# Patient Record
Sex: MALE | NOT HISPANIC OR LATINO | Employment: FULL TIME | URBAN - METROPOLITAN AREA
[De-identification: names, ages, dates, MRNs, and addresses within clinical notes are randomized per-mention and may not be internally consistent; named-entity substitution may affect disease eponyms.]

---

## 2023-09-12 NOTE — PROGRESS NOTES
"ENT Consultation    Eagle Hager who is a 43 year old male seen in consultation at the request of Emi Alvarez PA-C.      History of Present Illness - Eagle Hager is a 43 year old male ears   Presents primarily in regard to his left ear.  Apparently his baby gets 6 sets of tubes also had left tympanoplasty.  Has gradually felt that his hearing loss definitely worse on the left than the right.  However in the last year it has gotten much worse and lately has had intermittent mucus drainage and even some blood in the drainage was noted.  He apparently had an MRI done last year done at the [facility] which did not show any pathology according to the patient.  He has been on several antibiotics for drainage but no drops.  He apparently has history of seasonal allergies and even used to get allergy shots in the past when he was a child.  Has not been tested in the long time.      BP Readings from Last 1 Encounters:   09/20/23 130/82       Patient declined BP in clinic today    Eagle IS NOT a smoker/uses chewing tobacco.      Past Medical History - No past medical history on file.    Current Medications - No current outpatient medications on file.    Allergies -   Allergies   Allergen Reactions    Penicillins Other (See Comments) and Rash       Social History -   Social History     Socioeconomic History    Marital status: Single       Family History - No family history on file.    Review of Systems - As per HPI and PMHx, otherwise review of system review of the head and neck negative. Otherwise 10+ review of system is negative    Physical Exam  /82   Temp (!) 96.6  F (35.9  C) (Temporal)   Ht 1.918 m (6' 3.5\")   Wt 97.1 kg (214 lb)   BMI 26.40 kg/m    BMI: Body mass index is 26.4 kg/m .    General - The patient is well nourished and well developed, and appears to have good nutritional status.  Alert and oriented to person and place, answers questions and cooperates with examination " appropriately.    SKIN - No suspicious lesions or rashes.  Respiration - No respiratory distress.  Head and Face - Normocephalic and atraumatic, with no gross asymmetry noted of the contour of the facial features.  The facial nerve is intact, with strong symmetric movements.    Voice and Breathing - The patient was breathing comfortably without the use of accessory muscles. The patients voice was clear and strong, and had appropriate pitch and quality.    Ears - Bilateral pinna and EACs with normal appearing overlying skin.  Right tympanic membrane intact with good mobility on pneumatic otoscopy . Bony landmarks of the ossicular chain are normal. The tympanic membrane is normal in appearance. No retraction, perforation, or masses.  No fluid or purulence was seen in the external canal or the middle ear.   However on the left side significant inflammation was noted with what appears to be more anterior perforation with granulation tissue noted.  Eyes - Extraocular movements intact.  Sclera were not icteric or injected, conjunctiva were pink and moist.    Mouth - Examination of the oral cavity showed pink, healthy oral mucosa. No lesions or ulcerations noted.  The tongue was mobile and midline, and the dentition were in good condition.      Throat - The walls of the oropharynx were smooth, pink, moist, symmetric, and had no lesions or ulcerations.  The tonsillar pillars and soft palate were symmetric.  The uvula was midline on elevation.    Neck - Normal midline excursion of the laryngotracheal complex during swallowing.  Full range of motion on passive movement.  Palpation of the occipital, submental, submandibular, internal jugular chain, and supraclavicular nodes did not demonstrate any abnormal lymph nodes or masses.  The carotid pulse was palpable bilaterally.  Palpation of the thyroid was soft and smooth, with no nodules or goiter appreciated.  The trachea was mobile and midline.    Nose - External contour is  symmetric, no gross deflection or scars.  Nasal mucosa is pale and moist with some excess clear mucus.  The septum was midline and non-obstructive, turbinates of enlarged size and position.  No polyps, masses, or purulence noted on examination.    Neuro - Nonfocal neuro exam is normal, CN 2 through 12 intact, normal gait and muscle tone.      Performed in clinic today:  Audiologic Studies - An audiogram and tympanogram were performed today as part of the evaluation and personally reviewed. The tympanogram shows Type A curves on the right and Type B curves on the left, with normal on the right large on the left canal volumes and middle ear pressures.  The audiogram showed normal thresholds on the right and moderate mixed loss on the left.    Word recognition score 100% on the right and 100% on the left.    A/P - Eagle Hager is a 43 year old male patient with chronic otitis media following left ear.  At this point we will put him on Ciprodex drops since he has not used drops previously.  We will reevaluate in a few weeks to see how he is doing.  In regard to his allergic rhinitis patient will continue using nasal steroid sprays but also will add azelastine as adjunct.      Andrew Vega MD

## 2023-09-20 ENCOUNTER — OFFICE VISIT (OUTPATIENT)
Dept: AUDIOLOGY | Facility: OTHER | Age: 43
End: 2023-09-20
Payer: COMMERCIAL

## 2023-09-20 ENCOUNTER — OFFICE VISIT (OUTPATIENT)
Dept: OTOLARYNGOLOGY | Facility: OTHER | Age: 43
End: 2023-09-20
Payer: COMMERCIAL

## 2023-09-20 VITALS
DIASTOLIC BLOOD PRESSURE: 82 MMHG | TEMPERATURE: 96.6 F | WEIGHT: 214 LBS | HEIGHT: 76 IN | SYSTOLIC BLOOD PRESSURE: 130 MMHG | BODY MASS INDEX: 26.06 KG/M2

## 2023-09-20 DIAGNOSIS — J30.9 ALLERGIC RHINITIS, UNSPECIFIED SEASONALITY, UNSPECIFIED TRIGGER: Primary | ICD-10-CM

## 2023-09-20 DIAGNOSIS — H90.12 CONDUCTIVE HEARING LOSS OF LEFT EAR WITH UNRESTRICTED HEARING OF RIGHT EAR: Primary | ICD-10-CM

## 2023-09-20 DIAGNOSIS — H65.32: ICD-10-CM

## 2023-09-20 PROCEDURE — 92557 COMPREHENSIVE HEARING TEST: CPT | Performed by: AUDIOLOGIST

## 2023-09-20 PROCEDURE — 92567 TYMPANOMETRY: CPT | Performed by: AUDIOLOGIST

## 2023-09-20 PROCEDURE — 99203 OFFICE O/P NEW LOW 30 MIN: CPT | Performed by: OTOLARYNGOLOGY

## 2023-09-20 RX ORDER — AZELASTINE 1 MG/ML
2 SPRAY, METERED NASAL 2 TIMES DAILY
Qty: 30 ML | Refills: 1 | Status: SHIPPED | OUTPATIENT
Start: 2023-09-20 | End: 2023-10-20

## 2023-09-20 RX ORDER — CIPROFLOXACIN AND DEXAMETHASONE 3; 1 MG/ML; MG/ML
4 SUSPENSION/ DROPS AURICULAR (OTIC) 2 TIMES DAILY
Qty: 7.5 ML | Refills: 0 | Status: SHIPPED | OUTPATIENT
Start: 2023-09-20 | End: 2023-10-11

## 2023-09-20 ASSESSMENT — PAIN SCALES - GENERAL: PAINLEVEL: NO PAIN (0)

## 2023-09-20 NOTE — LETTER
"    9/20/2023         RE: John Hager  [address] [place] [address]        Dear Colleague,    Thank you for referring your patient, John Hager, to the [facility]. Please see a copy of my visit note below.    ENT Consultation    Eagle Hager who is a 43 year old male seen in consultation at the request of Emi Alvarez PA-C.      History of Present Illness - Eagle Hager is a 43 year old male ears   Presents primarily in regard to his left ear.  Apparently his baby gets 6 sets of tubes also had left tympanoplasty.  Has gradually felt that his hearing loss definitely worse on the left than the right.  However in the last year it has gotten much worse and lately has had intermittent mucus drainage and even some blood in the drainage was noted.  He apparently had an MRI done last year done at the [facility] which did not show any pathology according to the patient.  He has been on several antibiotics for drainage but no drops.  He apparently has history of seasonal allergies and even used to get allergy shots in the past when he was a child.  Has not been tested in the long time.      BP Readings from Last 1 Encounters:   09/20/23 130/82       Patient declined BP in clinic today    Eagle IS NOT a smoker/uses chewing tobacco.      Past Medical History - No past medical history on file.    Current Medications - No current outpatient medications on file.    Allergies -   Allergies   Allergen Reactions     Penicillins Other (See Comments) and Rash       Social History -   Social History     Socioeconomic History     Marital status: Single       Family History - No family history on file.    Review of Systems - As per HPI and PMHx, otherwise review of system review of the head and neck negative. Otherwise 10+ review of system is negative    Physical Exam  /82   Temp (!) 96.6  F (35.9  C) (Temporal)   Ht 1.918 m (6' 3.5\")   Wt 97.1 kg (214 lb)   BMI " 26.40 kg/m    BMI: Body mass index is 26.4 kg/m .    General - The patient is well nourished and well developed, and appears to have good nutritional status.  Alert and oriented to person and place, answers questions and cooperates with examination appropriately.    SKIN - No suspicious lesions or rashes.  Respiration - No respiratory distress.  Head and Face - Normocephalic and atraumatic, with no gross asymmetry noted of the contour of the facial features.  The facial nerve is intact, with strong symmetric movements.    Voice and Breathing - The patient was breathing comfortably without the use of accessory muscles. The patients voice was clear and strong, and had appropriate pitch and quality.    Ears - Bilateral pinna and EACs with normal appearing overlying skin.  Right tympanic membrane intact with good mobility on pneumatic otoscopy . Bony landmarks of the ossicular chain are normal. The tympanic membrane is normal in appearance. No retraction, perforation, or masses.  No fluid or purulence was seen in the external canal or the middle ear.   However on the left side significant inflammation was noted with what appears to be more anterior perforation with granulation tissue noted.  Eyes - Extraocular movements intact.  Sclera were not icteric or injected, conjunctiva were pink and moist.    Mouth - Examination of the oral cavity showed pink, healthy oral mucosa. No lesions or ulcerations noted.  The tongue was mobile and midline, and the dentition were in good condition.      Throat - The walls of the oropharynx were smooth, pink, moist, symmetric, and had no lesions or ulcerations.  The tonsillar pillars and soft palate were symmetric.  The uvula was midline on elevation.    Neck - Normal midline excursion of the laryngotracheal complex during swallowing.  Full range of motion on passive movement.  Palpation of the occipital, submental, submandibular, internal jugular chain, and supraclavicular nodes did not  demonstrate any abnormal lymph nodes or masses.  The carotid pulse was palpable bilaterally.  Palpation of the thyroid was soft and smooth, with no nodules or goiter appreciated.  The trachea was mobile and midline.    Nose - External contour is symmetric, no gross deflection or scars.  Nasal mucosa is pale and moist with some excess clear mucus.  The septum was midline and non-obstructive, turbinates of enlarged size and position.  No polyps, masses, or purulence noted on examination.    Neuro - Nonfocal neuro exam is normal, CN 2 through 12 intact, normal gait and muscle tone.      Performed in clinic today:  Audiologic Studies - An audiogram and tympanogram were performed today as part of the evaluation and personally reviewed. The tympanogram shows Type A curves on the right and Type B curves on the left, with normal on the right large on the left canal volumes and middle ear pressures.  The audiogram showed normal thresholds on the right and moderate mixed loss on the left.    Word recognition score 100% on the right and 100% on the left.    A/P - Eagle Hager is a 43 year old male patient with chronic otitis media following left ear.  At this point we will put him on Ciprodex drops since he has not used drops previously.  We will reevaluate in a few weeks to see how he is doing.  In regard to his allergic rhinitis patient will continue using nasal steroid sprays but also will add azelastine as adjunct.      Andrew Vega MD       Again, thank you for allowing me to participate in the care of your patient.        Sincerely,        Andrew Vega MD, MD

## 2023-09-20 NOTE — PROGRESS NOTES
AUDIOLOGY REPORT:    Patient was referred from ENT by Dr. Vega for audiology evaluation. The patient reports that he has had ear problems since he was an infant, and has had six sets of PE tubes as well as surgery to reconstruct the left eardrum, tonsillectomy, and adenoidectomy. He also reports a history of a tympanic membrane perforation in the right ear from waterskiing. The patient reports that he has hearing loss in the left ear, with no concerns about the right. He reports drainage from the left ear every few days. Antibiotics do not seem to help. The patient also notes difficulty equalizing pressure in the left ear, and he has allergy concerns. He does not have ear pain.    Testing:    Otoscopy:   Otoscopic exam indicates ears are clear of cerumen bilaterally     Tympanograms:    RIGHT: hypercompliant eardrum mobility     LEFT:   large ear canal volume consistent with tympanic membrane perforation    Thresholds:   Pure Tone Thresholds assessed using conventional audiometry with good reliability from 250-8000 Hz bilaterally using insert earphones and circumaural headphones     RIGHT:  normal hearing sensitivity at all tested frequencies     LEFT:     moderate rising to mild  conductive hearing loss with normal hearing sensitivity at 8000 Hz    Speech Reception Threshold:    RIGHT: 10 dB HL    LEFT:   40 dB HL  Results are in agreement with pure tone average.     Word Recognition Score:     RIGHT: 100% at 55 dB HL using NU-6 recorded word list.    LEFT:   100% at 80 dB HL using NU-6 recorded word list.    Discussed results with the patient.     Patient was returned to ENT for follow up.     Morgan Cobos, CCC-A  Licensed Audiologist #[id number]  9/20/2023

## 2023-10-30 NOTE — PROGRESS NOTES
"History of Present Illness - John Hager is a 43 year old male presenting in clinic today for a recheck on Patient presents with:  Follow Up    Patient initially presented with history of allergic rhinitis as well as persistent otitis media with drainage involving his left ear.  On the left side he has had multiple surgeries tympanoplasty's in the past.  He was switched to Ciprodex drops in the ear has cleared up.  Does not bother him anymore.  In regard to his nose he feels that the azelastine has been very helpful controlling his allergic rhinitis.      BP Readings from Last 1 Encounters:   11/01/23 122/82       BP noted to be well controlled today in office.     John IS NOT a smoker/uses chewing tobacco.     Past Medical History - No past medical history on file.    Current Medications - No current outpatient medications on file.    Allergies -   Allergies   Allergen Reactions    Penicillins Other (See Comments) and Rash       Social History -   Social History     Socioeconomic History    Marital status: Single   Tobacco Use    Smoking status: Former     Types: Cigarettes    Smokeless tobacco: Never   Substance and Sexual Activity    Alcohol use: Yes    Drug use: Never       Family History - No family history on file.    Review of Systems - As per HPI and PMHx, otherwise review of system review of the head and neck negative. Otherwise 10+ review of system is negative    Physical Exam  /82   Temp 97.2  F (36.2  C) (Temporal)   Ht 1.918 m (6' 3.5\")   Wt 100.7 kg (222 lb)   BMI 27.38 kg/m    BMI: Body mass index is 27.38 kg/m .    General - The patient is well nourished and well developed, and appears to have good nutritional status.  Alert and oriented to person and place, answers questions and cooperates with examination appropriately.    SKIN - No suspicious lesions or rashes.  Respiration - No respiratory distress.  Head and Face - Normocephalic and atraumatic, with no gross asymmetry noted of " the contour of the facial features.  The facial nerve is intact, with strong symmetric movements.    Voice and Breathing - The patient was breathing comfortably without the use of accessory muscles. The patients voice was clear and strong, and had appropriate pitch and quality.    Ears - Bilateral pinna and EACs with normal appearing overlying skin.  Right tympanic membrane intact with good mobility on pneumatic otoscopy .  Bony landmarks of the ossicular chain are normal. The tympanic membrane is normal in appearance. No retraction, perforation, or masses.  No fluid or purulence was seen in the external canal or the middle ear.   On the left side even though the ears dry mostly tympanic membrane is missing we can see the handle of the malleus and middle ear space mucosa that is clear.  Some scar tissue is noted in the area.  Eyes - Extraocular movements intact.  Sclera were not icteric or injected, conjunctiva were pink and moist.    Mouth - Examination of the oral cavity showed pink, healthy oral mucosa. No lesions or ulcerations noted.  The tongue was mobile and midline, and the dentition were in good condition.      Throat - The walls of the oropharynx were smooth, pink, moist, symmetric, and had no lesions or ulcerations.  The tonsillar pillars and soft palate were symmetric.  The uvula was midline on elevation.    Neck - Normal midline excursion of the laryngotracheal complex during swallowing.  Full range of motion on passive movement.  Palpation of the occipital, submental, submandibular, internal jugular chain, and supraclavicular nodes did not demonstrate any abnormal lymph nodes or masses.  The carotid pulse was palpable bilaterally.  Palpation of the thyroid was soft and smooth, with no nodules or goiter appreciated.  The trachea was mobile and midline.    Nose - External contour is symmetric, no gross deflection or scars.  Nasal mucosa is pink and moist with no abnormal mucus.  The septum was midline  and non-obstructive, turbinates of normal size and position.  No polyps, masses, or purulence noted on examination.    Neuro - Nonfocal neuro exam is normal, CN 2 through 12 intact, normal gait and muscle tone.      Performed in clinic today:  No procedures preformed in clinic today      A/P - John Hager is a 43 year old male Patient presents with:  Follow Up    Patient with a history of recurrent otitis media that has been subacute lately but now well controlled after the use of Ciprodex drops.  We discussed possibility of further surgeries for the tympanoplasty which would be difficult considering majority of the drum is missing.  Discussed possibility of amplification for his mixed loss but with well ventilated ear impression.  He will discuss this further with audiology.  In regard to his nose we will continue azelastine spray.  We will follow-up in 6 months.  John should follow up in 6 months.      At John next appointment they will need a hearing test.      Andrew Vega MD

## 2023-11-01 ENCOUNTER — OFFICE VISIT (OUTPATIENT)
Dept: OTOLARYNGOLOGY | Facility: OTHER | Age: 43
End: 2023-11-01
Payer: COMMERCIAL

## 2023-11-01 VITALS
DIASTOLIC BLOOD PRESSURE: 82 MMHG | BODY MASS INDEX: 27.03 KG/M2 | HEIGHT: 76 IN | WEIGHT: 222 LBS | TEMPERATURE: 97.2 F | SYSTOLIC BLOOD PRESSURE: 122 MMHG

## 2023-11-01 DIAGNOSIS — H74.12 CHRONIC ADHESIVE OTITIS MEDIA, LEFT: ICD-10-CM

## 2023-11-01 DIAGNOSIS — J30.9 ALLERGIC RHINITIS, UNSPECIFIED SEASONALITY, UNSPECIFIED TRIGGER: Primary | ICD-10-CM

## 2023-11-01 DIAGNOSIS — H72.92 TYMPANIC MEMBRANE PERFORATION, LEFT: ICD-10-CM

## 2023-11-01 PROCEDURE — 99214 OFFICE O/P EST MOD 30 MIN: CPT | Performed by: OTOLARYNGOLOGY

## 2023-11-01 RX ORDER — AMOXICILLIN 875 MG
875 TABLET ORAL 2 TIMES DAILY
COMMUNITY
Start: 2022-12-06 | End: 2022-12-13

## 2023-11-01 RX ORDER — AZELASTINE 1 MG/ML
1 SPRAY, METERED NASAL 2 TIMES DAILY
Qty: 30 ML | Refills: 3 | Status: SHIPPED | OUTPATIENT
Start: 2023-11-01 | End: 2024-03-27

## 2023-11-01 ASSESSMENT — PAIN SCALES - GENERAL: PAINLEVEL: NO PAIN (0)

## 2023-11-01 NOTE — LETTER
"    11/1/2023         RE: John Hager  [address] [place] [facility] [address]        Dear Colleague,    Thank you for referring your patient, John Hager, to the [facility]. Please see a copy of my visit note below.    History of Present Illness - John Hager is a 43 year old male presenting in clinic today for a recheck on Patient presents with:  Follow Up    Patient initially presented with history of allergic rhinitis as well as persistent otitis media with drainage involving his left ear.  On the left side he has had multiple surgeries tympanoplasty's in the past.  He was switched to Ciprodex drops in the ear has cleared up.  Does not bother him anymore.  In regard to his nose he feels that the azelastine has been very helpful controlling his allergic rhinitis.      BP Readings from Last 1 Encounters:   11/01/23 122/82       BP noted to be well controlled today in office.     John IS NOT a smoker/uses chewing tobacco.     Past Medical History - No past medical history on file.    Current Medications - No current outpatient medications on file.    Allergies -   Allergies   Allergen Reactions     Penicillins Other (See Comments) and Rash       Social History -   Social History     Socioeconomic History     Marital status: Single   Tobacco Use     Smoking status: Former     Types: Cigarettes     Smokeless tobacco: Never   Substance and Sexual Activity     Alcohol use: Yes     Drug use: Never       Family History - No family history on file.    Review of Systems - As per HPI and PMHx, otherwise review of system review of the head and neck negative. Otherwise 10+ review of system is negative    Physical Exam  /82   Temp 97.2  F (36.2  C) (Temporal)   Ht 1.918 m (6' 3.5\")   Wt 100.7 kg (222 lb)   BMI 27.38 kg/m    BMI: Body mass index is 27.38 kg/m .    General - The patient is well nourished and well developed, and appears to have good nutritional status.  " Alert and oriented to person and place, answers questions and cooperates with examination appropriately.    SKIN - No suspicious lesions or rashes.  Respiration - No respiratory distress.  Head and Face - Normocephalic and atraumatic, with no gross asymmetry noted of the contour of the facial features.  The facial nerve is intact, with strong symmetric movements.    Voice and Breathing - The patient was breathing comfortably without the use of accessory muscles. The patients voice was clear and strong, and had appropriate pitch and quality.    Ears - Bilateral pinna and EACs with normal appearing overlying skin.  Right tympanic membrane intact with good mobility on pneumatic otoscopy .  Bony landmarks of the ossicular chain are normal. The tympanic membrane is normal in appearance. No retraction, perforation, or masses.  No fluid or purulence was seen in the external canal or the middle ear.   On the left side even though the ears dry mostly tympanic membrane is missing we can see the handle of the malleus and middle ear space mucosa that is clear.  Some scar tissue is noted in the area.  Eyes - Extraocular movements intact.  Sclera were not icteric or injected, conjunctiva were pink and moist.    Mouth - Examination of the oral cavity showed pink, healthy oral mucosa. No lesions or ulcerations noted.  The tongue was mobile and midline, and the dentition were in good condition.      Throat - The walls of the oropharynx were smooth, pink, moist, symmetric, and had no lesions or ulcerations.  The tonsillar pillars and soft palate were symmetric.  The uvula was midline on elevation.    Neck - Normal midline excursion of the laryngotracheal complex during swallowing.  Full range of motion on passive movement.  Palpation of the occipital, submental, submandibular, internal jugular chain, and supraclavicular nodes did not demonstrate any abnormal lymph nodes or masses.  The carotid pulse was palpable bilaterally.   Palpation of the thyroid was soft and smooth, with no nodules or goiter appreciated.  The trachea was mobile and midline.    Nose - External contour is symmetric, no gross deflection or scars.  Nasal mucosa is pink and moist with no abnormal mucus.  The septum was midline and non-obstructive, turbinates of normal size and position.  No polyps, masses, or purulence noted on examination.    Neuro - Nonfocal neuro exam is normal, CN 2 through 12 intact, normal gait and muscle tone.      Performed in clinic today:  No procedures preformed in clinic today      A/P - John Hager is a 43 year old male Patient presents with:  Follow Up    Patient with a history of recurrent otitis media that has been subacute lately but now well controlled after the use of Ciprodex drops.  We discussed possibility of further surgeries for the tympanoplasty which would be difficult considering majority of the drum is missing.  Discussed possibility of amplification for his mixed loss but with well ventilated ear impression.  He will discuss this further with audiology.  In regard to his nose we will continue azelastine spray.  We will follow-up in 6 months.  John should follow up in 6 months.      At John next appointment they will need a hearing test.      Andrew Vega MD           Again, thank you for allowing me to participate in the care of your patient.        Sincerely,        Andrew Vega MD, MD

## 2024-03-27 DIAGNOSIS — J30.9 ALLERGIC RHINITIS, UNSPECIFIED SEASONALITY, UNSPECIFIED TRIGGER: ICD-10-CM

## 2024-03-27 RX ORDER — AZELASTINE HYDROCHLORIDE 137 UG/1
SPRAY, METERED NASAL
Qty: 30 ML | Refills: 3 | Status: SHIPPED | OUTPATIENT
Start: 2024-03-27 | End: 2024-09-11

## 2024-03-27 NOTE — TELEPHONE ENCOUNTER
Routing refill request to provider for review/approval because:  Drug not active on patient's medication list    Next appointment on 5/1.     Joanne Preciado RN on 3/27/2024 at 8:09 AM

## 2024-08-17 DIAGNOSIS — J30.9 ALLERGIC RHINITIS, UNSPECIFIED SEASONALITY, UNSPECIFIED TRIGGER: ICD-10-CM

## 2024-08-19 NOTE — TELEPHONE ENCOUNTER
REFILL REQUEST     Last Written Prescription Date:  3/27/24  Last Fill Quantity: 30mL,  # refills: 3   Last office visit with prescribing provider: 11/1/2023    Future Office Visit: Was to follow-up in May 2024, no show for appointment.     Patient will need an appointment for additional refills. Please assist in scheduling.     Katie Rebolledo RN   [facility]

## 2024-08-28 RX ORDER — AZELASTINE HYDROCHLORIDE 137 UG/1
1 SPRAY, METERED NASAL 2 TIMES DAILY
Refills: 3 | OUTPATIENT
Start: 2024-08-28

## 2024-09-11 ENCOUNTER — TELEPHONE (OUTPATIENT)
Dept: OTOLARYNGOLOGY | Facility: CLINIC | Age: 44
End: 2024-09-11
Payer: COMMERCIAL

## 2024-09-11 DIAGNOSIS — J30.9 ALLERGIC RHINITIS, UNSPECIFIED SEASONALITY, UNSPECIFIED TRIGGER: ICD-10-CM

## 2024-09-11 RX ORDER — AZELASTINE HYDROCHLORIDE 137 UG/1
1 SPRAY, METERED NASAL 2 TIMES DAILY
Qty: 30 ML | Refills: 3 | Status: SHIPPED | OUTPATIENT
Start: 2024-09-11

## 2024-09-11 NOTE — TELEPHONE ENCOUNTER
Spoke with patient and refilled prescription until appointment.    Signed Prescriptions:                        Disp   Refills    azelastine 137 MCG/SPRAY SOLN              30 mL  3        Sig: Spray 1 spray into both nostrils 2 times daily.  Authorizing Provider: RACH MANZANO  Ordering User: VIELKA RICHARD RN Care Coordinator, [facility] 09/11/24 10:31 AM

## 2024-09-11 NOTE — TELEPHONE ENCOUNTER
[facility] Call Center    Phone Message    May a detailed message be left on voicemail: yes     Reason for Call: Other: Per pt he needs his nasal spray refilled but he could not get an appt until Jan. He is wondering what he can do in the mean time. Please call to discuss. Thank you     Action Taken: Message routed to:  [facility] ([facility]): ENT    Travel Screening: Not Applicable     Date of Service:

## 2025-07-28 ENCOUNTER — TELEPHONE (OUTPATIENT)
Dept: ORTHOPEDICS | Facility: CLINIC | Age: 45
End: 2025-07-28
Payer: COMMERCIAL

## 2025-07-28 NOTE — TELEPHONE ENCOUNTER
Other: Patient has upcoming appointment for 2nd opinion with Dr Reeves on 7/31/25 for his knee and has been seen with Yennifer before, will need imaging sent over from [facility] before the appointment, also had patient call yennifer with [facility] fax number      Could we send this information to you in [id number] or would you prefer to receive a phone call?:   Patient would prefer a phone call   Okay to leave a detailed message?: Yes at Cell number on file:    Telephone Information:   Mobile [phone]